# Patient Record
Sex: MALE | URBAN - METROPOLITAN AREA
[De-identification: names, ages, dates, MRNs, and addresses within clinical notes are randomized per-mention and may not be internally consistent; named-entity substitution may affect disease eponyms.]

---

## 2024-09-10 ENCOUNTER — TELEPHONE (OUTPATIENT)
Dept: FAMILY MEDICINE | Facility: CLINIC | Age: 49
End: 2024-09-10

## 2024-09-10 DIAGNOSIS — M79.605 LEFT LEG PAIN: Primary | ICD-10-CM

## 2024-09-10 NOTE — TELEPHONE ENCOUNTER
I called the patient's spoke with the sister because does not have a phone at this time.  The patient was not present.  The patient has given verbal permission to speak with his sister previously.  Patient's x-rays show that there is no pathology with the knee on the left or the tibia and fibula.  The ultrasound of the veins on the lower extremity left are is negative.  However the patient is complaining of severe pain and he called the office and said he is in so much pain.  I will order a arterial ultrasound of the entire leg and send him to a vascular surgeon.